# Patient Record
Sex: MALE | Race: WHITE | NOT HISPANIC OR LATINO | URBAN - METROPOLITAN AREA
[De-identification: names, ages, dates, MRNs, and addresses within clinical notes are randomized per-mention and may not be internally consistent; named-entity substitution may affect disease eponyms.]

---

## 2017-02-20 ENCOUNTER — APPOINTMENT (RX ONLY)
Dept: URBAN - METROPOLITAN AREA CLINIC 51 | Facility: CLINIC | Age: 62
Setting detail: DERMATOLOGY
End: 2017-02-20

## 2017-02-20 DIAGNOSIS — L21.8 OTHER SEBORRHEIC DERMATITIS: ICD-10-CM

## 2017-02-20 DIAGNOSIS — L738 OTHER SPECIFIED DISEASES OF HAIR AND HAIR FOLLICLES: ICD-10-CM

## 2017-02-20 DIAGNOSIS — L73.9 FOLLICULAR DISORDER, UNSPECIFIED: ICD-10-CM

## 2017-02-20 DIAGNOSIS — L57.0 ACTINIC KERATOSIS: ICD-10-CM

## 2017-02-20 DIAGNOSIS — L663 OTHER SPECIFIED DISEASES OF HAIR AND HAIR FOLLICLES: ICD-10-CM

## 2017-02-20 PROBLEM — L02.222 FURUNCLE OF BACK [ANY PART, EXCEPT BUTTOCK]: Status: ACTIVE | Noted: 2017-02-20

## 2017-02-20 PROBLEM — L85.3 XEROSIS CUTIS: Status: ACTIVE | Noted: 2017-02-20

## 2017-02-20 PROBLEM — J30.1 ALLERGIC RHINITIS DUE TO POLLEN: Status: ACTIVE | Noted: 2017-02-20

## 2017-02-20 PROCEDURE — 99213 OFFICE O/P EST LOW 20 MIN: CPT | Mod: 25

## 2017-02-20 PROCEDURE — ? COUNSELING

## 2017-02-20 PROCEDURE — ? LIQUID NITROGEN

## 2017-02-20 PROCEDURE — 17000 DESTRUCT PREMALG LESION: CPT

## 2017-02-20 ASSESSMENT — LOCATION ZONE DERM
LOCATION ZONE: FACE
LOCATION ZONE: NOSE
LOCATION ZONE: TRUNK

## 2017-02-20 ASSESSMENT — LOCATION SIMPLE DESCRIPTION DERM
LOCATION SIMPLE: UPPER BACK
LOCATION SIMPLE: RIGHT CHEEK
LOCATION SIMPLE: NOSE
LOCATION SIMPLE: LEFT CHEEK

## 2017-02-20 ASSESSMENT — LOCATION DETAILED DESCRIPTION DERM
LOCATION DETAILED: LEFT INFERIOR CENTRAL MALAR CHEEK
LOCATION DETAILED: RIGHT CENTRAL MALAR CHEEK
LOCATION DETAILED: NASAL TIP
LOCATION DETAILED: SUPERIOR THORACIC SPINE

## 2017-02-20 NOTE — PROCEDURE: LIQUID NITROGEN
Render Post-Care Instructions In Note?: yes
Duration Of Freeze Thaw-Cycle (Seconds): 5
Post-Care Instructions: I reviewed with the patient in detail post-care instructions. Patient is to wear sunprotection, and avoid picking at any of the treated lesions. Pt may apply Vaseline to crusted or scabbing areas.
Detail Level: Zone
Number Of Freeze-Thaw Cycles: 2 freeze-thaw cycles
Consent: The patient's verbal consent was obtained including but not limited to risks of crusting, scabbing, blistering, scarring, darker or lighter pigmentary change, recurrence, incomplete removal and infection.

## 2017-02-20 NOTE — HPI: RASH
How Severe Is Your Rash?: mild
Is This A New Presentation, Or A Follow-Up?: Follow Up Rash
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
Additional History: Pt states the rash comes and goes

## 2017-03-11 ENCOUNTER — HOSPITAL ENCOUNTER (EMERGENCY)
Dept: HOSPITAL 75 - ER | Age: 62
Discharge: HOME | End: 2017-03-11
Payer: SELF-PAY

## 2017-03-11 VITALS — WEIGHT: 156 LBS | HEIGHT: 69 IN | BODY MASS INDEX: 23.11 KG/M2

## 2017-03-11 VITALS — SYSTOLIC BLOOD PRESSURE: 147 MMHG | DIASTOLIC BLOOD PRESSURE: 88 MMHG
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
## 2017-03-11 DIAGNOSIS — S39.012A: Primary | ICD-10-CM
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
## 2017-03-11 DIAGNOSIS — S29.011A: ICD-10-CM

## 2017-03-11 LAB
ALBUMIN SERPL-MCNC: 4 G/DL (ref 3.2–4.5)
ALT SERPL-CCNC: 31 U/L (ref 0–55)
ANION GAP SERPL CALC-SCNC: 10 MMOL/L (ref 5–14)
AST SERPL-CCNC: 30 U/L (ref 5–34)
BASOPHILS # BLD AUTO: 0 10^3/UL (ref 0–0.1)
BASOPHILS NFR BLD AUTO: 0 % (ref 0–10)
BILIRUB SERPL-MCNC: 0.8 MG/DL (ref 0.1–1)
BILIRUB UR QL STRIP: NEGATIVE
BUN SERPL-MCNC: 17 MG/DL (ref 7–18)
BUN/CREAT SERPL: 17
CALCIUM SERPL-MCNC: 8.9 MG/DL (ref 8.5–10.1)
CHLORIDE SERPL-SCNC: 104 MMOL/L (ref 98–107)
CO2 SERPL-SCNC: 24 MMOL/L (ref 21–32)
CREAT SERPL-MCNC: 1.02 MG/DL (ref 0.6–1.3)
EOSINOPHIL # BLD AUTO: 0.1 10^3/UL (ref 0–0.3)
EOSINOPHIL NFR BLD AUTO: 1 % (ref 0–10)
ERYTHROCYTE [DISTWIDTH] IN BLOOD BY AUTOMATED COUNT: 12.2 % (ref 10–14.5)
GFR SERPLBLD BASED ON 1.73 SQ M-ARVRAT: > 60 ML/MIN
GLUCOSE SERPL-MCNC: 95 MG/DL (ref 70–105)
KETONES UR QL STRIP: NEGATIVE
LEUKOCYTE ESTERASE UR QL STRIP: NEGATIVE
LIPASE SERPL-CCNC: 35 U/L (ref 8–78)
LYMPHOCYTES # BLD AUTO: 1.3 X 10^3 (ref 1–4)
LYMPHOCYTES NFR BLD AUTO: 14 % (ref 12–44)
MCH RBC QN AUTO: 33 PG (ref 25–34)
MCHC RBC AUTO-ENTMCNC: 36 G/DL (ref 32–36)
MCV RBC AUTO: 90 FL (ref 80–99)
MONOCYTES # BLD AUTO: 1.4 X 10^3 (ref 0–1)
MONOCYTES NFR BLD AUTO: 14 % (ref 0–12)
NEUTROPHILS # BLD AUTO: 6.8 X 10^3 (ref 1.8–7.8)
NEUTROPHILS NFR BLD AUTO: 71 % (ref 42–75)
NITRITE UR QL STRIP: NEGATIVE
PH UR STRIP: 5 [PH] (ref 5–9)
PLATELET # BLD: 311 10^3/UL (ref 130–400)
PMV BLD AUTO: 10.3 FL (ref 7.4–10.4)
POTASSIUM SERPL-SCNC: 3.9 MMOL/L (ref 3.6–5)
PROT SERPL-MCNC: 7.2 G/DL (ref 6.4–8.2)
PROT UR QL STRIP: (no result)
RBC # BLD AUTO: 4.82 10^6/UL (ref 4.35–5.85)
SODIUM SERPL-SCNC: 138 MMOL/L (ref 135–145)
SP GR UR STRIP: 1.02 (ref 1.02–1.02)
SQUAMOUS #/AREA URNS HPF: (no result) /HPF
TROPONIN I SERPL-MCNC: < 0.3 NG/ML (ref ?–0.3)
UROBILINOGEN UR-MCNC: NORMAL MG/DL
WBC # BLD AUTO: 9.7 10^3/UL (ref 4.3–11)
WBC #/AREA URNS HPF: (no result) /HPF

## 2017-03-11 PROCEDURE — 93005 ELECTROCARDIOGRAM TRACING: CPT

## 2017-03-11 PROCEDURE — 85025 COMPLETE CBC W/AUTO DIFF WBC: CPT

## 2017-03-11 PROCEDURE — 71275 CT ANGIOGRAPHY CHEST: CPT

## 2017-03-11 PROCEDURE — 80053 COMPREHEN METABOLIC PANEL: CPT

## 2017-03-11 PROCEDURE — 71020: CPT

## 2017-03-11 PROCEDURE — 36415 COLL VENOUS BLD VENIPUNCTURE: CPT

## 2017-03-11 PROCEDURE — 74175 CTA ABDOMEN W/CONTRAST: CPT

## 2017-03-11 PROCEDURE — 85379 FIBRIN DEGRADATION QUANT: CPT

## 2017-03-11 PROCEDURE — 96360 HYDRATION IV INFUSION INIT: CPT

## 2017-03-11 PROCEDURE — 84484 ASSAY OF TROPONIN QUANT: CPT

## 2017-03-11 PROCEDURE — 81000 URINALYSIS NONAUTO W/SCOPE: CPT

## 2017-03-11 PROCEDURE — 83690 ASSAY OF LIPASE: CPT

## 2017-03-11 NOTE — DIAGNOSTIC IMAGING REPORT
PROCEDURE: CT angiography of the abdomen and chest with and

without contrast.



TECHNIQUE: After intravenous administration of contrast, thin

section axial CT angiography of the abdomen and chest were

obtained. Multiple MIP reformats were provided.



INDICATION:  Chest pain, elevated d-dimer.



FINDINGS: There are no discrete pulmonary nodules, masses or

infiltrates. There is no pleural or pericardial fluid. There is

no pneumothorax. There is no pathologically enlarged adenopathy

in the chest. The thoracic aorta is normal in caliber without

evidence of dissection. There are no filling defects seen within

the pulmonary arteries to suggest pulmonary embolism. There are

mild degenerative changes in the spine.



The liver is normal in size without focal lesions. Gallbladder

is unremarkable. There is no biliary ductal dilatation. Spleen

is unremarkable. The pancreas and adrenal glands are

unremarkable. Kidneys are normal. The bowel gas pattern is

nonspecific. There is no free air. There is no ascites. There

are no focal inflammatory changes.



IMPRESSION:

1. No acute abnormality of the chest. Specifically, there is no

evidence of pulmonary embolism or aortic dissection.



2. No acute abnormality in the abdomen.



Dictated by:



Dictated on workstation # [id number]

## 2017-03-11 NOTE — DIAGNOSTIC IMAGING REPORT
INDICATION:  Left-sided rib pain..



TECHNIQUE:  Two view chest   8:13 AM



CORRELATION STUDY:   None



FINDINGS:

The heart size, mediastinal configuration and pulmonary

vasculature are within normal limits.  Suggestion of minimal

atelectasis at the right perihilar region. Remaining lung fields

clear and unremarkable.  Visualized osseous structures are

unremarkable.



IMPRESSION:

1. No radiographic evidence for acute abnormality of the chest.



Dictated by:



Dictated on workstation # [id number]

## 2017-03-11 NOTE — ED ABDOMINAL PAIN
General


Chief Complaint:  Abdominal/GI Problems


Stated Complaint:  L SIDE PAIN


Source of Information:  Patient


Exam Limitations:  No Limitations





History of Present Illness


Time Seen By Provider:  07:20


Initial Comments


Here with report of left side low chest wall, upper abdominal pain that is 

worse with deep breathing and better at rest.  States it is 5 out of 10 and 

resting an 8 out of 10 with a deep breath.  Denies shortness of breath.  Was 

not sure if it is his back or ribs.  Does not remember any specific injury.  

Denies nausea or vomiting.  Denies fever or chills.


Timing/Duration:  1-2 Days


Severity/Quality:  Moderate, Aching


Location:  LUQ


Radiation:  Chest, Flank


Modifying Factors:  Worsens With Breathing, Worsens With Movement, Improves 

With Resting


Associated Symptoms:  No Diaphoresis, No Fever/Chills, No Nausea/Vomiting, No 

Shortness of Air, No Weakness





Allergies and Home Medications


Allergies


Coded Allergies:  


     No Known Drug Allergies (Unverified , 3/11/17)





Home Medications


No Active Prescriptions or Reported Meds





Review of Systems


Constitutional:   see HPINo chills, No fever


EENTM:  No Throat Pain,  Other (runny nose with allergies but this is chronic 

and unchanged)


Respiratory:  Denies Cough, Denies Shortness of Air


Cardiovascular:   See HPIDenies Lightheadedness, Denies Palpitations


Gastrointestinal:   Abdominal PainDenies Diarrhea, Denies Nausea, Denies Rectal 

Bleeding, Denies Vomiting


Genitourinary:   See HPI Flank PainDenies Hematuria, Denies Pain


Musculoskeletal:   back painNo muscle weakness


Skin:   no symptoms reported


Psychiatric/Neurological:   No Symptoms Reported





All Other Systems Reviewed


Negative Unless Noted:  Yes





Past Medical-Social-Family Hx


Patient Social History


Alcohol Use:  Denies Use


Recreational Drug Use:  No


Smoking Status:  Never a Smoker


Recent Foreign Travel:  No


Contact w/Someone Who Travel:  No


Recent Hopitalizations:  No





Surgeries


HX Surgeries:  No





Respiratory


Hx Respiratory Disorders:  No





Cardiovascular


Hx Cardiac Disorders:  No





Neurological


Hx Neurological Disorders:  No





Genitourinary


Hx Genitourinary Disorders:  No





Gastrointestinal


Hx Gastrointestinal Disorders:  No





Musculoskeletal


Hx Musculoskeletal Disorders:  No





Endocrine


Hx Endocrine Disorders:  No





HEENT


HX ENT Disorders:  No





Cancer


Hx Cancer:  No





Psychosocial


Hx Psychiatric Problems:  No





Reviewed Nursing Assessment


Reviewed/Agree w Nursing PMH:  Yes





Family Medical History


Significant Family History:  No Pertinent Family Hx





Physical Exam


Vital Signs





 VS - Last 72 Hours, by Label








 3/11/17





 07:13


 


Temp 98.7


 


Pulse 93


 


Resp 18


 


B/P 152/94


 


Pulse Ox 96


 


O2 Delivery Room Air





Capillary Refill :


General Appearance:   WD/WN no apparent distress


HEENT:   PERRL/EOMI pharynx normal


Neck:   full range of motion supple


Respiratory:   lungs clear normal breath sounds


Cardiovascular:   regular rate, rhythm no murmur tachycardia


Peripheral Pulses:  2+ Dorsalis Pedis (R), 2+ Left Dors-Pedis (L), 2+ Radial 

Pulses (R), 2+ Radial Pulses (L)


Gastrointestinal:   non tender soft


Extremities:   non-tender normal inspection


Back:   normal inspection no CVA tenderness no vertebral tenderness


Neurologic/Psychiatric:   alert oriented x 3


Skin:   normal color warm/dry





Progress/Results/Core Measures


Results/Orders


Lab Results





 Laboratory Tests








Test


  3/11/17


07:32 3/11/17


07:35 Range/Units


 


 


Alanine Aminotransferase


(ALT/SGPT) 31 


  


  0-55  U/L


 


 


Albumin 4.0   3.2-4.5  G/DL


 


Alkaline Phosphatase 80     U/L


 


Anion Gap 10   5-14  MMOL/L


 


Aspartate Amino Transf


(AST/SGOT) 30 


  


  5-34  U/L


 


 


BUN/Creatinine Ratio 17    


 


Basophils # (Auto)


  0.0 


  


  0.0-0.1


10^3/uL


 


Basophils (%) (Auto) 0   0-10  %


 


Blood Urea Nitrogen 17   7-18  MG/DL


 


Calcium Level 8.9   8.5-10.1  MG/DL


 


Carbon Dioxide Level 24   21-32  MMOL/L


 


Chloride Level 104     MMOL/L


 


Creatinine


  1.02 


  


  0.60-1.30


MG/DL


 


D-Dimer


  0.94 H


  


  0.00-0.49


UG/ML


 


Eosinophils # (Auto)


  0.1 


  


  0.0-0.3


10^3/uL


 


Eosinophils (%) (Auto) 1   0-10  %


 


Estimat Glomerular Filtration


Rate > 60 


  


   


 


 


Glucose Level 95     MG/DL


 


Hematocrit 43   40-54  %


 


Hemoglobin 15.8   13.3-17.7  G/DL


 


Lipase 35   8-78  U/L


 


Lymphocytes # (Auto) 1.3   1.0-4.0  X 10^3


 


Lymphocytes (%) (Auto) 14   12-44  %


 


Mean Corpuscular Hemoglobin 33   25-34  PG


 


Mean Corpuscular Hemoglobin


Concent 36 


  


  32-36  G/DL


 


 


Mean Corpuscular Volume 90   80-99  FL


 


Mean Platelet Volume 10.3   7.4-10.4  FL


 


Monocytes # (Auto) 1.4 H  0.0-1.0  X 10^3


 


Monocytes (%) (Auto) 14 H  0-12  %


 


Neutrophils # (Auto) 6.8   1.8-7.8  X 10^3


 


Neutrophils (%) (Auto) 71   42-75  %


 


Platelet Count


  311 


  


  130-400


10^3/uL


 


Potassium Level 3.9   3.6-5.0  MMOL/L


 


Red Blood Count


  4.82 


  


  4.35-5.85


10^6/uL


 


Red Cell Distribution Width 12.2   10.0-14.5  %


 


Sodium Level 138   135-145  MMOL/L


 


Total Bilirubin 0.8   0.1-1.0  MG/DL


 


Total Protein 7.2   6.4-8.2  G/DL


 


Troponin I < 0.30   <0.30  NG/ML


 


White Blood Count


  9.7 


  


  4.3-11.0


10^3/uL


 


Urine Bacteria  NEGATIVE   /HPF


 


Urine Bilirubin  NEGATIVE  NEGATIVE  


 


Urine Casts  NONE   /LPF


 


Urine Clarity  CLEAR   


 


Urine Color  YELLOW   


 


Urine Crystals  NONE   /LPF


 


Urine Culture Indicated  NO   


 


Urine Glucose (UA)  NEGATIVE  NEGATIVE  


 


Urine Ketones  NEGATIVE  NEGATIVE  


 


Urine Leukocyte Esterase  NEGATIVE  NEGATIVE  


 


Urine Mucus  NEGATIVE   /LPF


 


Urine Nitrite  NEGATIVE  NEGATIVE  


 


Urine Protein  1+ H NEGATIVE  


 


Urine RBC  NONE   /HPF


 


Urine RBC (Auto)  NEGATIVE  NEGATIVE  


 


Urine Specific Gravity  1.025 H 1.016-1.022  


 


Urine Squamous Epithelial


Cells 


  NONE 


   /HPF


 


 


Urine Urobilinogen  NORMAL  NORMAL  MG/DL


 


Urine WBC  NONE   /HPF


 


Urine pH  5  5-9  








My Orders





 Orders-ZAHIRA CABRERA MD


Cbc With Automated Diff (3/11/17 07:25)


Comprehensive Metabolic Panel (3/11/17 07:25)


Fibrin Degradation Products (3/11/17 07:25)


Lipase (3/11/17 07:25)


Troponin I (3/11/17 07:25)


Ua Culture If Indicated (3/11/17 07:25)


Chest Pa/Lat (2 View) (3/11/17 07:25)


Saline Lock/Iv-Start (3/11/17 07:25)


Ns Iv 1000 Ml (Sodium Chloride 0.9%) (3/11/17 07:25)


Ekg Tracing (3/11/17 07:25)


Ct Angio Chest/Abd (3/11/17 08:39)


Iohexol Injection (Omnipaque 350 Mg/Ml 1 (3/11/17 08:45)


Sodium Chloride Flush (Catheter Flush Sy (3/11/17 08:45)


Ns (Ivpb) (Sodium Chloride 0.9% Ivpb Bag (3/11/17 08:45)





Medications Given in ED





 Current Medications








 Medications  Dose


 Ordered  Sig/Ngoc


 Route  Start Time


 Stop Time Status Last Admin


Dose Admin


 


 Iohexol  100 ml  ONCE  ONCE


 IV  3/11/17 08:45


 3/11/17 08:46 DC 3/11/17 08:58


100 ML


 


 Sodium Chloride  100 ml  ONCE  ONCE


 IV  3/11/17 08:45


 3/11/17 08:46 DC 3/11/17 08:59


80 ML


 


 Sodium Chloride  1,000 ml @ 


 0 mls/hr  Q0M ONCE


 IV  3/11/17 07:25


 3/11/17 07:30 DC 3/11/17 07:47


1,000 MLS/HR








Vital Signs/I&O





 Vital Sign - Last 12Hours








 3/11/17





 07:13


 


Temp 98.7


 


Pulse 93


 


Resp 18


 


B/P 152/94


 


Pulse Ox 96


 


O2 Delivery Room Air








Progress Note :  


Progress Note


Seen and evaluated.  IV, labs, EKG and chest x-ray ordered.  UA ordered.  

Normal saline 1 L bolus.  Patient declined pain medicine.  Monitor patient.  No 

acute findings except for d-dimer elevation.  CT angiogram chest and abdomen 

ordered for elevated d-dimer.  0955: CT angiogram was negative.  Results below.

  No other acute findings.  We will treat as possible muscle strain.  Patient 

admits that he had concerns about aneurysm because a friend of his  from 

aneurysm last year.  Symptoms that he has certainly mimic possible aneurysm but 

this is negative on the CT and patient was informed and greatly comforted.  He 

will follow-up with his doctor this week.  Discharged home with return 

precautions.  Patient verbalize understanding instructions and agreement with 

plan.





ECG


Initial ECG Impression Date:  Mar 11, 2017


Initial ECG Impression Time:  07:40


Initial ECG Rate:  81


Initial ECG Rhythm:  Normal Sinus


Initial ECG Impression:  Normal


Initial ECG Comparisson:  No Previous ECG Available


Comment


Sinus rhythm with normal axis.  No evidence of ST elevation MI.  No previous 

available for comparison.  Interpreted by me.





Diagnostic Imaging





   Diagonstic Imaging:  Xray


   Plain Films/CT/US/NM/MRI:  chest


Comments


 VIA [facility], [facility].


 [place], [place]





NAME:   RACHEL KING


[facility] REC#:   [id number]


ACCOUNT#:   [id number]


PT STATUS:   REG ER


:   1955


PHYSICIAN:   ZAHIRA CABRERA MD


ADMIT DATE:   17/ER


 ***Draft***


Date of Exam:17





CHEST PA/LAT (2 VIEW)














INDICATION:  Left-sided rib pain..





TECHNIQUE:  Two view chest   8:13 AM





CORRELATION STUDY:   None





FINDINGS:


The heart size, mediastinal configuration and pulmonary


vasculature are within normal limits.  Suggestion of minimal


atelectasis at the right perihilar region. Remaining lung fields


clear and unremarkable.  Visualized osseous structures are


unremarkable.





IMPRESSION:


1. No radiographic evidence for acute abnormality of the chest.





Dictated on workstation # [id number]








Dict:   [id number]


Trans:   17 0815


ELA [id number]





Interpreted by:     BRIGIDO GUERRERO DO


Electronically signed by:


   Reviewed:  Reviewed by Me








   Diagonstic Imaging:  CT


   Plain Films/CT/US/NM/MRI:  chest, abdomen


Comments


 [place], [place]





NAME:   RACHEL KING


[facility] REC#:   [id number]


ACCOUNT#:   [id number]


PT STATUS:   REG ER


:   1955


PHYSICIAN:   ZAHIRA CABRERA MD


ADMIT DATE:   17/ER


 ***Draft***


Date of Exam:17





CT ANGIO CHEST/ABD














PROCEDURE: CT angiography of the abdomen and chest with and


without contrast.





TECHNIQUE: After intravenous administration of contrast, thin


section axial CT angiography of the abdomen and chest were


obtained. Multiple MIP reformats were provided.





INDICATION:  Chest pain, elevated d-dimer.





FINDINGS: There are no discrete pulmonary nodules, masses or


infiltrates. There is no pleural or pericardial fluid. There is


no pneumothorax. There is no pathologically enlarged adenopathy


in the chest. The thoracic aorta is normal in caliber without


evidence of dissection. There are no filling defects seen within


the pulmonary arteries to suggest pulmonary embolism. There are


mild degenerative changes in the spine.





The liver is normal in size without focal lesions. Gallbladder


is unremarkable. There is no biliary ductal dilatation. Spleen


is unremarkable. The pancreas and adrenal glands are


unremarkable. Kidneys are normal. The bowel gas pattern is


nonspecific. There is no free air. There is no ascites. There


are no focal inflammatory changes.





IMPRESSION:


1. No acute abnormality of the chest. Specifically, there is no


evidence of pulmonary embolism or aortic dissection.





2. No acute abnormality in the abdomen.





Dictated on workstation # [id number]








Dict:   [id number]


Trans:   17 0944


 [id number]





Interpreted by:     BERENICE FENTON


Electronically signed by:


   Reviewed:  Reviewed by Me





Departure


Impression


Impression:  


 Primary Impression:  


 Back strain


 Qualified Code:  S39.012A - Strain of muscle, fascia and tendon of lower back, 

initial encounter


 Additional Impression:  


 Chest wall muscle strain


 Qualified Code:  S29.011A - Strain of muscle and tendon of front wall of thorax

, initial encounter


Disposition:  01 HOME, SELF-CARE


Condition:  Improved





Departure-Patient Inst.


Decision time for Depature:  10:02


Referrals:  


CHRISTINA MEIER MD (PCP/Family)


Primary Care Physician


Patient Instructions:  Muscle Strain (DC)





Add. Discharge Instructions:


All discharge instructions reviewed with patient and/or family. Voiced 

understanding.





Follow-up with Dr. Meier this week for recheck and further evaluation.  Take 

medication as directed.  You may take over-the-counter Aleve 1 or 2 tablets 

twice daily for the next several days as needed.  Drink plenty of fluids as 

this medicine can be hard on the kidneys if you're dehydrated.  Return for 

worse pain, fever, vomiting, weakness, breathing problems or other concerns as 

needed.


Scripts


Cyclobenzaprine HCl 10 Mg Ovjoou17 Mg PO Q8H PRN SPASMS #15 TAB


   Prov:ZAHIRA CABRERA MD         3/11/17





Copy


Copies To 1:   CHRISTINA MEIER MD, TIMOTHY D MD Mar 11, 2017 07:37

## 2017-11-01 ENCOUNTER — APPOINTMENT (RX ONLY)
Dept: URBAN - METROPOLITAN AREA CLINIC 51 | Facility: CLINIC | Age: 62
Setting detail: DERMATOLOGY
End: 2017-11-01

## 2017-11-01 DIAGNOSIS — L738 OTHER SPECIFIED DISEASES OF HAIR AND HAIR FOLLICLES: ICD-10-CM

## 2017-11-01 DIAGNOSIS — L57.0 ACTINIC KERATOSIS: ICD-10-CM

## 2017-11-01 DIAGNOSIS — L663 OTHER SPECIFIED DISEASES OF HAIR AND HAIR FOLLICLES: ICD-10-CM

## 2017-11-01 DIAGNOSIS — L73.9 FOLLICULAR DISORDER, UNSPECIFIED: ICD-10-CM

## 2017-11-01 PROBLEM — L02.426 FURUNCLE OF LEFT LOWER LIMB: Status: ACTIVE | Noted: 2017-11-01

## 2017-11-01 PROCEDURE — ? TREATMENT REGIMEN

## 2017-11-01 PROCEDURE — ? COUNSELING

## 2017-11-01 PROCEDURE — 99213 OFFICE O/P EST LOW 20 MIN: CPT | Mod: 25

## 2017-11-01 PROCEDURE — ? LIQUID NITROGEN

## 2017-11-01 PROCEDURE — 17000 DESTRUCT PREMALG LESION: CPT

## 2017-11-01 ASSESSMENT — LOCATION SIMPLE DESCRIPTION DERM
LOCATION SIMPLE: LEFT CHEEK
LOCATION SIMPLE: RIGHT CHEEK
LOCATION SIMPLE: LEFT PRETIBIAL REGION
LOCATION SIMPLE: RIGHT EAR
LOCATION SIMPLE: LEFT HAND
LOCATION SIMPLE: RIGHT TEMPLE
LOCATION SIMPLE: RIGHT HAND

## 2017-11-01 ASSESSMENT — LOCATION DETAILED DESCRIPTION DERM
LOCATION DETAILED: RIGHT ULNAR DORSAL HAND
LOCATION DETAILED: RIGHT ANTIHELIX
LOCATION DETAILED: RIGHT ANTERIOR EARLOBE
LOCATION DETAILED: RIGHT CENTRAL MALAR CHEEK
LOCATION DETAILED: LEFT PROXIMAL PRETIBIAL REGION
LOCATION DETAILED: LEFT SUPERIOR MEDIAL MALAR CHEEK
LOCATION DETAILED: RIGHT SUPERIOR CENTRAL MALAR CHEEK
LOCATION DETAILED: RIGHT INFERIOR TEMPLE
LOCATION DETAILED: LEFT DORSAL MIDDLE METACARPOPHALANGEAL JOINT

## 2017-11-01 ASSESSMENT — LOCATION ZONE DERM
LOCATION ZONE: EAR
LOCATION ZONE: HAND
LOCATION ZONE: FACE
LOCATION ZONE: LEG

## 2017-11-01 NOTE — PROCEDURE: LIQUID NITROGEN
Number Of Freeze-Thaw Cycles: 2 freeze-thaw cycles
Consent: The patient's verbal consent was obtained including but not limited to risks of crusting, scabbing, blistering, scarring, darker or lighter pigmentary change, recurrence, incomplete removal and infection.
Detail Level: Detailed
Duration Of Freeze Thaw-Cycle (Seconds): 5
Render Post-Care Instructions In Note?: yes
Post-Care Instructions: I reviewed with the patient in detail post-care instructions. Patient is to wear sunprotection, and avoid picking at any of the treated lesions. Pt may apply Vaseline to crusted or scabbing areas.

## 2017-11-01 NOTE — HPI: SKIN LESIONS
Is This A New Presentation, Or A Follow-Up?: Skin Lesions
How Severe Is Your Skin Lesion?: mild
Have Your Skin Lesions Been Treated?: not been treated
Additional History: 6 month skin check.

## 2017-11-01 NOTE — PROCEDURE: TREATMENT REGIMEN
Plan: If areas persist, patient to call and we will treat with antibiotics
Samples Given: Vanicream HC
Detail Level: Detailed

## 2018-05-01 ENCOUNTER — APPOINTMENT (RX ONLY)
Dept: URBAN - METROPOLITAN AREA CLINIC 51 | Facility: CLINIC | Age: 63
Setting detail: DERMATOLOGY
End: 2018-05-01

## 2018-05-01 DIAGNOSIS — L57.0 ACTINIC KERATOSIS: ICD-10-CM

## 2018-05-01 DIAGNOSIS — D22 MELANOCYTIC NEVI: ICD-10-CM

## 2018-05-01 PROBLEM — D22.5 MELANOCYTIC NEVI OF TRUNK: Status: ACTIVE | Noted: 2018-05-01

## 2018-05-01 PROCEDURE — 17003 DESTRUCT PREMALG LES 2-14: CPT

## 2018-05-01 PROCEDURE — ? COUNSELING

## 2018-05-01 PROCEDURE — ? LIQUID NITROGEN

## 2018-05-01 PROCEDURE — 17000 DESTRUCT PREMALG LESION: CPT

## 2018-05-01 ASSESSMENT — LOCATION ZONE DERM
LOCATION ZONE: TRUNK
LOCATION ZONE: FACE
LOCATION ZONE: EAR
LOCATION ZONE: HAND

## 2018-05-01 ASSESSMENT — LOCATION DETAILED DESCRIPTION DERM
LOCATION DETAILED: RIGHT SUPERIOR HELIX
LOCATION DETAILED: LEFT MEDIAL ZYGOMA
LOCATION DETAILED: RIGHT CENTRAL MALAR CHEEK
LOCATION DETAILED: LEFT RADIAL DORSAL HAND
LOCATION DETAILED: LEFT SUPERIOR LATERAL MIDBACK
LOCATION DETAILED: RIGHT CENTRAL TEMPLE

## 2018-05-01 ASSESSMENT — LOCATION SIMPLE DESCRIPTION DERM
LOCATION SIMPLE: RIGHT TEMPLE
LOCATION SIMPLE: LEFT LOWER BACK
LOCATION SIMPLE: RIGHT CHEEK
LOCATION SIMPLE: LEFT ZYGOMA
LOCATION SIMPLE: LEFT HAND
LOCATION SIMPLE: RIGHT EAR

## 2018-05-01 ASSESSMENT — PAIN INTENSITY VAS: HOW INTENSE IS YOUR PAIN 0 BEING NO PAIN, 10 BEING THE MOST SEVERE PAIN POSSIBLE?: NO PAIN

## 2018-05-01 NOTE — PROCEDURE: LIQUID NITROGEN
Consent: The patient's verbal consent was obtained including but not limited to risks of crusting, scabbing, blistering, scarring, darker or lighter pigmentary change, recurrence, incomplete removal and infection.
Detail Level: Zone
Duration Of Freeze Thaw-Cycle (Seconds): 5
Number Of Freeze-Thaw Cycles: 2 freeze-thaw cycles
Post-Care Instructions: I reviewed with the patient in detail post-care instructions. Patient is to wear sunprotection, and avoid picking at any of the treated lesions. Pt may apply Vaseline to crusted or scabbing areas.
Render Post-Care Instructions In Note?: yes

## 2018-11-13 ENCOUNTER — APPOINTMENT (RX ONLY)
Dept: URBAN - METROPOLITAN AREA CLINIC 51 | Facility: CLINIC | Age: 63
Setting detail: DERMATOLOGY
End: 2018-11-13

## 2018-11-13 DIAGNOSIS — L57.0 ACTINIC KERATOSIS: ICD-10-CM

## 2018-11-13 PROCEDURE — ? PRESCRIPTION

## 2018-11-13 PROCEDURE — ? COUNSELING

## 2018-11-13 PROCEDURE — 17000 DESTRUCT PREMALG LESION: CPT

## 2018-11-13 PROCEDURE — ? LIQUID NITROGEN

## 2018-11-13 PROCEDURE — 17003 DESTRUCT PREMALG LES 2-14: CPT

## 2018-11-13 RX ORDER — IMIQUIMOD 50 MG/G
CREAM TOPICAL
Qty: 12 | Refills: 1 | Status: ERX | COMMUNITY
Start: 2018-11-13

## 2018-11-13 RX ADMIN — IMIQUIMOD: 50 CREAM TOPICAL at 13:28

## 2018-11-13 ASSESSMENT — LOCATION DETAILED DESCRIPTION DERM
LOCATION DETAILED: LEFT NASAL ALA
LOCATION DETAILED: LEFT NASAL DORSUM
LOCATION DETAILED: RIGHT FOREHEAD
LOCATION DETAILED: LEFT LATERAL ZYGOMA
LOCATION DETAILED: RIGHT INFERIOR MEDIAL FOREHEAD

## 2018-11-13 ASSESSMENT — LOCATION ZONE DERM
LOCATION ZONE: NOSE
LOCATION ZONE: FACE

## 2018-11-13 ASSESSMENT — LOCATION SIMPLE DESCRIPTION DERM
LOCATION SIMPLE: LEFT ZYGOMA
LOCATION SIMPLE: RIGHT FOREHEAD
LOCATION SIMPLE: NOSE
LOCATION SIMPLE: LEFT NOSE

## 2018-11-13 ASSESSMENT — PAIN INTENSITY VAS: HOW INTENSE IS YOUR PAIN 0 BEING NO PAIN, 10 BEING THE MOST SEVERE PAIN POSSIBLE?: NO PAIN

## 2018-11-13 NOTE — PROCEDURE: LIQUID NITROGEN
Render Post-Care Instructions In Note?: yes
Consent: The patient's verbal consent was obtained including but not limited to risks of crusting, scabbing, blistering, scarring, darker or lighter pigmentary change, recurrence, incomplete removal and infection.
Number Of Freeze-Thaw Cycles: 2 freeze-thaw cycles
Detail Level: Zone
Duration Of Freeze Thaw-Cycle (Seconds): 5
Post-Care Instructions: I reviewed with the patient in detail post-care instructions. Patient is to wear sunprotection, and avoid picking at any of the treated lesions. Pt may apply Vaseline to crusted or scabbing areas.

## 2019-07-13 ENCOUNTER — HOSPITAL ENCOUNTER (EMERGENCY)
Dept: HOSPITAL 75 - ER | Age: 64
Discharge: HOME | End: 2019-07-13
Payer: SELF-PAY

## 2019-07-13 VITALS — BODY MASS INDEX: 22.96 KG/M2 | WEIGHT: 155 LBS | HEIGHT: 69 IN

## 2019-07-13 VITALS — DIASTOLIC BLOOD PRESSURE: 100 MMHG | SYSTOLIC BLOOD PRESSURE: 137 MMHG

## 2019-07-13 DIAGNOSIS — K64.4: Primary | ICD-10-CM

## 2019-07-13 LAB
ALBUMIN SERPL-MCNC: 4.1 GM/DL (ref 3.2–4.5)
ALP SERPL-CCNC: 71 U/L (ref 40–136)
ALT SERPL-CCNC: 28 U/L (ref 0–55)
BASOPHILS # BLD AUTO: 0 10^3/UL (ref 0–0.1)
BASOPHILS NFR BLD AUTO: 1 % (ref 0–10)
BILIRUB SERPL-MCNC: 0.4 MG/DL (ref 0.1–1)
BUN/CREAT SERPL: 16
CALCIUM SERPL-MCNC: 8.6 MG/DL (ref 8.5–10.1)
CHLORIDE SERPL-SCNC: 107 MMOL/L (ref 98–107)
CO2 SERPL-SCNC: 24 MMOL/L (ref 21–32)
CREAT SERPL-MCNC: 1.1 MG/DL (ref 0.6–1.3)
EOSINOPHIL # BLD AUTO: 0.3 10^3/UL (ref 0–0.3)
EOSINOPHIL NFR BLD AUTO: 5 % (ref 0–10)
ERYTHROCYTE [DISTWIDTH] IN BLOOD BY AUTOMATED COUNT: 12.3 % (ref 10–14.5)
GFR SERPLBLD BASED ON 1.73 SQ M-ARVRAT: > 60 ML/MIN
GLUCOSE SERPL-MCNC: 93 MG/DL (ref 70–105)
HCT VFR BLD CALC: 43 % (ref 40–54)
HGB BLD-MCNC: 15.4 G/DL (ref 13.3–17.7)
LYMPHOCYTES # BLD AUTO: 1.6 X 10^3 (ref 1–4)
LYMPHOCYTES NFR BLD AUTO: 26 % (ref 12–44)
MANUAL DIFFERENTIAL PERFORMED BLD QL: NO
MCH RBC QN AUTO: 33 PG (ref 25–34)
MCHC RBC AUTO-ENTMCNC: 36 G/DL (ref 32–36)
MCV RBC AUTO: 92 FL (ref 80–99)
MONOCYTES # BLD AUTO: 0.6 X 10^3 (ref 0–1)
MONOCYTES NFR BLD AUTO: 9 % (ref 0–12)
NEUTROPHILS # BLD AUTO: 3.7 X 10^3 (ref 1.8–7.8)
NEUTROPHILS NFR BLD AUTO: 59 % (ref 42–75)
PLATELET # BLD: 297 10^3/UL (ref 130–400)
PMV BLD AUTO: 9.9 FL (ref 7.4–10.4)
POTASSIUM SERPL-SCNC: 4.1 MMOL/L (ref 3.6–5)
PROT SERPL-MCNC: 7.4 GM/DL (ref 6.4–8.2)
SODIUM SERPL-SCNC: 139 MMOL/L (ref 135–145)
WBC # BLD AUTO: 6.3 10^3/UL (ref 4.3–11)

## 2019-07-13 PROCEDURE — 85025 COMPLETE CBC W/AUTO DIFF WBC: CPT

## 2019-07-13 PROCEDURE — 80053 COMPREHEN METABOLIC PANEL: CPT

## 2019-07-13 PROCEDURE — 36415 COLL VENOUS BLD VENIPUNCTURE: CPT

## 2019-07-13 NOTE — ED GI
General


Chief Complaint:  Rect Problems


Stated Complaint:  BLOOD IN STOOL


Nursing Triage Note:  


AMB TO ROOM REPORTS LAST NIGHT AND TODAY NOTICED BRIGHT RED BLOOD IN STOOL. DOES




REPORT HAVING HEMORRHOIDS.


Sepsis Screen:  No Definite Risk


Source of Information:  Patient


Exam Limitations:  No Limitations





History of Present Illness


Date Seen by Provider:  Jul 13, 2019


Time Seen by Provider:  09:54


Initial Comments


The patient is a 63-year-old white male who presents with complaints of rectal 

bleeding.  He relates that he has had hemorrhoids for 25 years or more.  These 

of blood on occasion.  He has never had a colonoscopy.  He states that the blood

is bright red and both in the water of the toilet and on the tissue.  He has 

[place] over the past week or more.


Timing/Duration:  2-3 Days





Allergies and Home Medications


Allergies


Coded Allergies:  


     No Known Drug Allergies (Unverified , 3/11/17)





Patient Home Medication List


Home Medication List Reviewed:  Yes





Review of Systems


Review of Systems


Constitutional:  see HPI


EENTM:  No Symptoms Reported


Respiratory:  No Symptoms Reported


Cardiovascular:  No Symptoms Reported


Gastrointestinal:  Rectal Bleeding


Genitourinary:  No Symptoms Reported


Musculoskeletal:  no symptoms reported


Skin:  no symptoms reported


Psychiatric/Neurological:  No Symptoms Reported


Endocrine:  No Symptoms Reported


Hematologic/Lymphatic:  No Symptoms Reported





Past Medical-Social-Family Hx


Patient Social History


Alcohol Use:  Denies Use


Recreational Drug Use:  No


Smoking Status:  Never a Smoker


Recent Foreign Travel:  No


Contact w/Someone Who Travel:  No


Recent Infectious Disease Expo:  No


Recent Hopitalizations:  No





Past Medical History


Surgeries:  No


Respiratory:  No


Cardiac:  No


Neurological:  No


Gastrointestinal:  No


Musculoskeletal:  No


Endocrine:  No


Cancer:  No


Psychosocial:  No





Family Medical History


No Pertinent Family Hx





Physical Exam


Vital Signs





Vital Signs - First Documented








 7/13/19





 08:57


 


Temp 98.2


 


Pulse 83


 


Resp 18


 


B/P (MAP) 157/87 (110)





Capillary Refill : Less Than 3 Seconds


Height/Weight/BMI


Height: 5'9.00"


Weight: 155lbs. oz. 70.375773lf;  BMI


Method:Stated


General Appearance:  WD/WN, no apparent distress


HEENT:  normal ENT inspection


Neck:  non-tender, full range of motion, supple, normal inspection


Respiratory:  chest non-tender, lungs clear, normal breath sounds, no 

respiratory distress


Cardiovascular:  regular rate, rhythm, no edema, no gallop, no JVD


Exam Comments


Visual exam of the anus shows a 1 cm evacuated external hemorrhoid at 12 

o'clock.  Digital exam shows some grittiness in the canal suggesting internal 

hemorrhoids.





Progress/Results/Core Measures


Results/Orders


Lab Results





Laboratory Tests








Test


 7/13/19


09:57 Range/Units


 


 


White Blood Count


 6.3 


 4.3-11.0


10^3/uL


 


Red Blood Count


 4.72 


 4.35-5.85


10^6/uL


 


Hemoglobin 15.4  13.3-17.7  G/DL


 


Hematocrit 43  40-54  %


 


Mean Corpuscular Volume 92  80-99  FL


 


Mean Corpuscular Hemoglobin 33  25-34  PG


 


Mean Corpuscular Hemoglobin


Concent 36 


 32-36  G/DL





 


Red Cell Distribution Width 12.3  10.0-14.5  %


 


Platelet Count


 297 


 130-400


10^3/uL


 


Mean Platelet Volume 9.9  7.4-10.4  FL


 


Neutrophils (%) (Auto) 59  42-75  %


 


Lymphocytes (%) (Auto) 26  12-44  %


 


Monocytes (%) (Auto) 9  0-12  %


 


Eosinophils (%) (Auto) 5  0-10  %


 


Basophils (%) (Auto) 1  0-10  %


 


Neutrophils # (Auto) 3.7  1.8-7.8  X 10^3


 


Lymphocytes # (Auto) 1.6  1.0-4.0  X 10^3


 


Monocytes # (Auto) 0.6  0.0-1.0  X 10^3


 


Eosinophils # (Auto)


 0.3 


 0.0-0.3


10^3/uL


 


Basophils # (Auto)


 0.0 


 0.0-0.1


10^3/uL


 


Sodium Level 139  135-145  MMOL/L


 


Potassium Level 4.1  3.6-5.0  MMOL/L


 


Chloride Level 107    MMOL/L


 


Carbon Dioxide Level 24  21-32  MMOL/L


 


Anion Gap 8  5-14  MMOL/L


 


Blood Urea Nitrogen 18  7-18  MG/DL


 


Creatinine


 1.10 


 0.60-1.30


MG/DL


 


Estimat Glomerular Filtration


Rate > 60 


  





 


BUN/Creatinine Ratio 16   


 


Glucose Level 93    MG/DL


 


Calcium Level 8.6  8.5-10.1  MG/DL


 


Corrected Calcium 8.5  8.5-10.1  MG/DL


 


Total Bilirubin 0.4  0.1-1.0  MG/DL


 


Aspartate Amino Transf


(AST/SGOT) 27 


 5-34  U/L





 


Alanine Aminotransferase


(ALT/SGPT) 28 


 0-55  U/L





 


Alkaline Phosphatase 71    U/L


 


Total Protein 7.4  6.4-8.2  GM/DL


 


Albumin 4.1  3.2-4.5  GM/DL








My Orders





Orders - MISTY GOMEZ MD


Cbc With Automated Diff (7/13/19 09:33)


Comprehensive Metabolic Panel (7/13/19 09:33)





Vital Signs/I&O











 7/13/19





 08:57


 


Temp 98.2


 


Pulse 83


 


Resp 18


 


B/P (MAP) 157/87 (110)














Blood Pressure Mean:                    110











Departure


Impression





   Primary Impression:  


   external hemorrhoid


Disposition:  01 HOME, SELF-CARE


Condition:  Stable/Unchanged





Departure-Patient Inst.


Decision time for Depature:  10:51


Referrals:  


CHRISTINA MEIER MD (PCP/Family)


Primary Care Physician





Add. Discharge Instructions:  


All discharge instructions reviewed with patient and/or family. Voiced 

understanding.











MISTY GOMEZ MD             Jul 13, 2019 09:56

## 2021-04-21 ENCOUNTER — HOSPITAL ENCOUNTER (EMERGENCY)
Dept: HOSPITAL 75 - ER | Age: 66
Discharge: HOME | End: 2021-04-21
Payer: SELF-PAY

## 2021-04-21 VITALS — SYSTOLIC BLOOD PRESSURE: 142 MMHG | DIASTOLIC BLOOD PRESSURE: 81 MMHG

## 2021-04-21 VITALS — WEIGHT: 156.97 LBS | BODY MASS INDEX: 23.25 KG/M2 | HEIGHT: 69.02 IN

## 2021-04-21 DIAGNOSIS — Z20.822: ICD-10-CM

## 2021-04-21 DIAGNOSIS — Z77.22: ICD-10-CM

## 2021-04-21 DIAGNOSIS — I10: ICD-10-CM

## 2021-04-21 DIAGNOSIS — R07.89: Primary | ICD-10-CM

## 2021-04-21 LAB
ALBUMIN SERPL-MCNC: 4.6 GM/DL (ref 3.2–4.5)
ALP SERPL-CCNC: 75 U/L (ref 40–136)
ALT SERPL-CCNC: 34 U/L (ref 0–55)
APTT BLD: 29 SEC (ref 24–35)
BASOPHILS # BLD AUTO: 0 10^3/UL (ref 0–0.1)
BASOPHILS NFR BLD AUTO: 1 % (ref 0–10)
BILIRUB SERPL-MCNC: 0.8 MG/DL (ref 0.1–1)
BUN/CREAT SERPL: 14
CALCIUM SERPL-MCNC: 9.3 MG/DL (ref 8.5–10.1)
CHLORIDE SERPL-SCNC: 102 MMOL/L (ref 98–107)
CK SERPL-CCNC: 118 U/L (ref 30–200)
CO2 SERPL-SCNC: 23 MMOL/L (ref 21–32)
CREAT SERPL-MCNC: 1.31 MG/DL (ref 0.6–1.3)
EOSINOPHIL # BLD AUTO: 0.1 10^3/UL (ref 0–0.3)
EOSINOPHIL NFR BLD AUTO: 1 % (ref 0–10)
GFR SERPLBLD BASED ON 1.73 SQ M-ARVRAT: 55 ML/MIN
GLUCOSE SERPL-MCNC: 116 MG/DL (ref 70–105)
HCT VFR BLD CALC: 48 % (ref 40–54)
HGB BLD-MCNC: 16.6 G/DL (ref 13.3–17.7)
INR PPP: 1 (ref 0.8–1.4)
LYMPHOCYTES # BLD AUTO: 2.2 10^3/UL (ref 1–4)
LYMPHOCYTES NFR BLD AUTO: 30 % (ref 12–44)
MAGNESIUM SERPL-MCNC: 2.1 MG/DL (ref 1.6–2.4)
MANUAL DIFFERENTIAL PERFORMED BLD QL: NO
MCH RBC QN AUTO: 33 PG (ref 25–34)
MCHC RBC AUTO-ENTMCNC: 35 G/DL (ref 32–36)
MCV RBC AUTO: 95 FL (ref 80–99)
MONOCYTES # BLD AUTO: 1 10^3/UL (ref 0–1)
MONOCYTES NFR BLD AUTO: 14 % (ref 0–12)
NEUTROPHILS # BLD AUTO: 3.9 10^3/UL (ref 1.8–7.8)
NEUTROPHILS NFR BLD AUTO: 55 % (ref 42–75)
PLATELET # BLD: 348 10^3/UL (ref 130–400)
PMV BLD AUTO: 9.8 FL (ref 9–12.2)
POTASSIUM SERPL-SCNC: 3.8 MMOL/L (ref 3.6–5)
PROT SERPL-MCNC: 8.3 GM/DL (ref 6.4–8.2)
PROTHROMBIN TIME: 13.4 SEC (ref 12.2–14.7)
SODIUM SERPL-SCNC: 136 MMOL/L (ref 135–145)
WBC # BLD AUTO: 7.2 10^3/UL (ref 4.3–11)

## 2021-04-21 PROCEDURE — 84484 ASSAY OF TROPONIN QUANT: CPT

## 2021-04-21 PROCEDURE — 83874 ASSAY OF MYOGLOBIN: CPT

## 2021-04-21 PROCEDURE — 85730 THROMBOPLASTIN TIME PARTIAL: CPT

## 2021-04-21 PROCEDURE — 36415 COLL VENOUS BLD VENIPUNCTURE: CPT

## 2021-04-21 PROCEDURE — 80053 COMPREHEN METABOLIC PANEL: CPT

## 2021-04-21 PROCEDURE — 99284 EMERGENCY DEPT VISIT MOD MDM: CPT

## 2021-04-21 PROCEDURE — 87804 INFLUENZA ASSAY W/OPTIC: CPT

## 2021-04-21 PROCEDURE — 85025 COMPLETE CBC W/AUTO DIFF WBC: CPT

## 2021-04-21 PROCEDURE — 85379 FIBRIN DEGRADATION QUANT: CPT

## 2021-04-21 PROCEDURE — 86141 C-REACTIVE PROTEIN HS: CPT

## 2021-04-21 PROCEDURE — 93041 RHYTHM ECG TRACING: CPT

## 2021-04-21 PROCEDURE — 83735 ASSAY OF MAGNESIUM: CPT

## 2021-04-21 PROCEDURE — 85610 PROTHROMBIN TIME: CPT

## 2021-04-21 PROCEDURE — 82550 ASSAY OF CK (CPK): CPT

## 2021-04-21 PROCEDURE — 87635 SARS-COV-2 COVID-19 AMP PRB: CPT

## 2021-04-21 PROCEDURE — 71045 X-RAY EXAM CHEST 1 VIEW: CPT

## 2021-04-21 PROCEDURE — 93005 ELECTROCARDIOGRAM TRACING: CPT

## 2021-04-21 RX ADMIN — NITROGLYCERIN PRN MG: 0.4 TABLET SUBLINGUAL at 10:08

## 2021-04-21 RX ADMIN — NITROGLYCERIN PRN MG: 0.4 TABLET SUBLINGUAL at 09:58

## 2021-04-21 RX ADMIN — NITROGLYCERIN PRN MG: 0.4 TABLET SUBLINGUAL at 10:03

## 2021-04-21 NOTE — DIAGNOSTIC IMAGING REPORT
HISTORY: Chest pain.



COMPARISON: 03/11/2017.



TECHNIQUE: Frontal view of the chest.



FINDINGS:



Lung volumes are normal. No focal consolidation is seen. There is

no pleural effusion or pneumothorax. The cardiac silhouette is

normal in size.



IMPRESSION:

1. No acute pulmonary abnormality.



Dictated by: 



  Dictated on workstation # [id number]

## 2021-04-21 NOTE — ED CHEST PAIN
General


Chief Complaint:  Chest Pain


Stated Complaint:  CP,JAW PAIN,NECK PAIN


Nursing Triage Note:  


PT AMB TO RM 7 AFTER BEING SENT BY DR NAIR OFFICE FOR FURTHER EVAULATION. PT 


STATES HE HAS CP THAT STARTED LAST NIGHT. STATES HE THOUGHT HE HAD HEARTBURN. 


STATES HE HAS A STABBING PAIN AND SORENESS THAT RADIATES TO HIS JAW AND NECK.


Nursing Sepsis Screen:  No Definite Risk


Source:  patient, EMS


Exam Limitations:  no limitations





History of Present Illness


Date Seen by Provider:  2021


Time Seen by Provider:  09:45


Initial Comments


This 65-year-old gentleman presents to the emergency room at the direction of 

Dr. Tawil's office for evaluation of chest pain.  He describes a burning chest 

pain that also seems tender to palpation across the chest musculature.  This 

started last night around 22:00.  He presumed it to be heartburn.  He states the

pain radiated to both jaws and his teeth were diffusely aching.  He denies any 

cough or shortness of breath but does describe a generalized myalgia, worse than

his chronic pain.  He denies any acute cough or shortness of breath.  He is 

afebrile.  He denies vomiting or diarrhea.  He has no known cardiac history.  He

is currently being treated for UTI





Allergies and Home Medications


Allergies


Coded Allergies:  


     No Known Drug Allergies (Unverified , 3/11/17)





Patient Home Medication List


Home Medication List Reviewed:  Yes





Review of Systems


Review of Systems


Constitutional:  no symptoms reported


EENTM:  No Symptoms Reported


Respiratory:  No Symptoms Reported


Cardiovascular:  See HPI


Gastrointestinal:  No Symptoms Reported


Genitourinary:  No Symptoms Reported


Musculoskeletal:  see HPI


Skin:  no symptoms reported


Psychiatric/Neurological:  No Symptoms Reported


Endocrine:  No Symptoms Reported


Hematologic/Lymphatic:  No Symptoms Reported





Past Medical-Social-Family Hx


Past Med/Social Hx:  Reviewed Nursing Past Med/Soc Hx


Patient Social History


Alcohol Use:  Past History


Smoking Status:  Never a Smoker


2nd Hand Smoke Exposure:  Yes


Recent Infectious Disease Expo:  No


Recent Hopitalizations:  No





Immunizations Up To Date


Tetanus Booster (TDap):  Unknown





Past Medical History


Surgeries:  No


Respiratory:  No


Cardiac:  No


Neurological:  No


Genitourinary:  Yes


Gastrointestinal:  No


Musculoskeletal:  No


Endocrine:  No


Cancer:  No


Psychosocial:  No





Family Medical History


No Pertinent Family Hx





Physical Exam


Vital Signs





Vital Signs - First Documented




















Capillary Refill : Less Than 3 Seconds


Height, Weight, BMI


Height: 5'9.00"


Weight: 155lbs. oz. 70.869283nz; 23.00 BMI


Method:Stated


General Appearance:  No Apparent Distress, WD/WN


HEENT:  PERRL/EOMI, Normal ENT Inspection


Neck:  Normal Inspection


Respiratory:  Lungs Clear, Normal Breath Sounds, No Accessory Muscle Use, No 

Respiratory Distress


Cardiovascular:  No Edema, No Murmur, Tachycardia, Other (Tenderness of the 

chest wall with self palpation)


Gastrointestinal:  Normal Bowel Sounds, Non Tender, Soft


Extremity:  Normal Inspection, No Pedal Edema


Neurologic/Psychiatric:  Alert, Oriented x3, No Motor/Sensory Deficits, Normal 

Mood/Affect, CNs II-XII Norm as Tested


Skin:  Normal Color, Warm/Dry





Progress/Results/Core Measures


Results/Orders


Lab Results





Laboratory Tests








Test


 21


09:46 21


09:48 21


09:56 21


11:54 Range/Units


 


 


D-Dimer


 <= 0.27 


 


 


 


 0.00-0.49


UG/ML


 


White Blood Count


 


 7.2 


 


 


 4.3-11.0


10^3/uL


 


Red Blood Count


 


 5.05 


 


 


 4.30-5.52


10^6/uL


 


Hemoglobin  16.6    13.3-17.7  g/dL


 


Hematocrit  48    40-54  %


 


Mean Corpuscular Volume  95    80-99  fL


 


Mean Corpuscular Hemoglobin  33    25-34  pg


 


Mean Corpuscular Hemoglobin


Concent 


 35 


 


 


 32-36  g/dL





 


Red Cell Distribution Width  12.2    10.0-14.5  %


 


Platelet Count


 


 348 


 


 


 130-400


10^3/uL


 


Mean Platelet Volume  9.8    9.0-12.2  fL


 


Immature Granulocyte % (Auto)  0     %


 


Neutrophils (%) (Auto)  55    42-75  %


 


Lymphocytes (%) (Auto)  30    12-44  %


 


Monocytes (%) (Auto)  14 H   0-12  %


 


Eosinophils (%) (Auto)  1    0-10  %


 


Basophils (%) (Auto)  1    0-10  %


 


Neutrophils # (Auto)


 


 3.9 


 


 


 1.8-7.8


10^3/uL


 


Lymphocytes # (Auto)


 


 2.2 


 


 


 1.0-4.0


10^3/uL


 


Monocytes # (Auto)


 


 1.0 


 


 


 0.0-1.0


10^3/uL


 


Eosinophils # (Auto)


 


 0.1 


 


 


 0.0-0.3


10^3/uL


 


Basophils # (Auto)


 


 0.0 


 


 


 0.0-0.1


10^3/uL


 


Immature Granulocyte # (Auto)


 


 0.0 


 


 


 0.0-0.1


10^3/uL


 


Prothrombin Time  13.4    12.2-14.7  SEC


 


INR Comment  1.0    0.8-1.4  


 


Activated Partial


Thromboplast Time 


 29 


 


 


 24-35  SEC





 


Sodium Level  136    135-145  MMOL/L


 


Potassium Level  3.8    3.6-5.0  MMOL/L


 


Chloride Level  102      MMOL/L


 


Carbon Dioxide Level  23    21-32  MMOL/L


 


Anion Gap  11    5-14  MMOL/L


 


Blood Urea Nitrogen  18    7-18  MG/DL


 


Creatinine


 


 1.31 H


 


 


 0.60-1.30


MG/DL


 


Estimat Glomerular Filtration


Rate 


 55 


 


 


  





 


BUN/Creatinine Ratio  14     


 


Glucose Level  116 H     MG/DL


 


Calcium Level  9.3    8.5-10.1  MG/DL


 


Corrected Calcium      8.5-10.1  MG/DL


 


Magnesium Level  2.1    1.6-2.4  MG/DL


 


Total Bilirubin  0.8    0.1-1.0  MG/DL


 


Aspartate Amino Transf


(AST/SGOT) 


 34 


 


 


 5-34  U/L





 


Alanine Aminotransferase


(ALT/SGPT) 


 34 


 


 


 0-55  U/L





 


Alkaline Phosphatase  75      U/L


 


Total Creatine Kinase  118      U/L


 


Myoglobin


 


 83.4 


 


 


 10.0-92.0


NG/ML


 


Troponin I  < 0.028   < 0.028  <0.028  NG/ML


 


C-Reactive Protein High


Sensitivity 


 0.12 


 


 


 0.00-0.50


MG/DL


 


Total Protein  8.3 H   6.4-8.2  GM/DL


 


Albumin  4.6 H   3.2-4.5  GM/DL


 


Coronavirus 2019 (CATRACHO)   Not Detected   Not Detecte  








Micro Results





Microbiology


21 Influenza Types A,B Antigen (LUCIA) - Final, Complete


          





My Orders





Orders - OSEI BELLA MD


Nitroglycerin 0.4 Mg Btl 25's (Nitrostat (21 09:45)


Aspirin Chewable Tablet (Baby Aspirin Ch (21 09:45)


Cbc With Automated Diff (21 09:53)


Magnesium (21 09:53)


Chest 1 View, Ap/Pa Only (21 09:53)


Ekg Tracing (21 09:53)


Comprehensive Metabolic Panel (21 09:53)


Myoglobin Serum (21 09:53)


Protime With Inr (21 09:53)


Partial Thromboplastin Time (21 09:53)


O2 (21 09:53)


Monitor-Rhythm Ecg Trace Only (21 09:53)


Ed Iv/Invasive Line Start (21 09:53)


Troponin I (21 09:53)


Nitroglycerin 0.4 Mg Btl 25's (Nitrostat (21 10:00)


Creatine Kinase (21 09:53)


Aspirin Chewable Tablet (Baby Aspirin Ch (21 10:00)


Hs C Reactive Protein (21 09:54)


Influenza A And B Antigens (21 09:54)


Covid 19 Inhouse Test (21 09:54)


Fibrin Degradation Products (21 10:12)


Lactated Ringers (Lr 1000 Ml Iv Solution (21 10:45)


Troponin I (21 11:45)





Medications Given in ED





Current Medications








 Medications  Dose


 Ordered  Sig/Ngoc


 Route  Start Time


 Stop Time Status Last Admin


Dose Admin


 


 Aspirin  324 mg  ONCE  ONCE


 PO  21 10:00


 21 10:01 DC 21 09:58


324 MG


 


 Lactated Ringer's  1,000 ml @ 


 0 mls/hr  Q0M ONCE


 IV  21 10:45


 21 10:46 DC 21 11:17


0 MLS/HR


 


 Nitroglycerin  0.4 mg  UD  PRN


 SL  21 10:00


 21 10:08 DC 21 10:08


0.4 MG








Vital Signs/I&O











 21





 09:43 09:43 12:35


 


Pulse 105  85


 


Resp 27  18


 


B/P (MAP) 171/96 (121)  142/81


 


Pulse Ox 98  97


 


O2 Delivery Room Air Room Air 














Blood Pressure Mean:                    121











Progress


Progress Note #1:  


   Time:  10:45


Progress Note


Patient received aspirin and nitroglycerin.  Chest pain seemed to be atypical in

nature and he reports he can reproduce it with palpation.  He also reported 

generalized muscle aches and tenderness in multiple locations.  Creatinine 

kinase was negative.  Initial troponin was also negative.  EKG was unremarkable.

 Patient rated his pain as 7/10.  This improved to 3/10 after nitroglycerin x3. 

Blood pressure is improving.  A 2-hour troponin will be obtained.


Progress Note #2:  


   Time:  12:53


Progress Note


Patient has been pain-free now for about an hour.  No additional nitroglycerin 

was given.  Blood pressure is much improved.  Repeat troponin was negative.  

Patient was advised to follow-up with Dr. Meier and cardiology as soon as 

possible.  He was advised to stop any activity that causes pain and if his pain 

does not resolve quickly to return to the emergency room.


Initial ECG Impression Date:  2021


Initial ECG Impression Time:  09:44


Initial ECG Rate:  104


Initial ECG Rhythm:  S.Tach


Comment


Sinus tachycardia with no ST elevation or depression.  No abnormal intervals or 

axis deviation.





Diagnostic Imaging





   Diagonstic Imaging:  Xray


   Plain Films/CT/US/NM/MRI:  chest


Comments


NAME:   RACHEL KING


[facility] REC#:   [id number]


ACCOUNT#:   [id number]


PT STATUS:   REG ER


:   1955


PHYSICIAN:   OSEI BELLA MD


ADMIT DATE:   21/ER


***Signed***


Date of Exam:21





CHEST 1 VIEW, AP/PA ONLY





HISTORY: Chest pain.





COMPARISON: 2017.





TECHNIQUE: Frontal view of the chest.





FINDINGS:





Lung volumes are normal. No focal consolidation is seen. There is


no pleural effusion or pneumothorax. The cardiac silhouette is


normal in size.





IMPRESSION:


1. No acute pulmonary abnormality.





Dictated by: 





  Dictated on workstation # [id number]





Dict:   21 1113


Trans:   21 1122


[id number] [id number]





Interpreted by:     TOLU HOFFMAN MD


Electronically signed by: TOLU HOFFMAN MD 21 1122





Departure


Impression





   Primary Impression:  


   Atypical chest pain


   Additional Impression:  


   Hypertension


   Qualified Codes:  I10 - Essential (primary) hypertension


Disposition:  01 HOME, SELF-CARE


Condition:  Improved





Departure-Patient Inst.


Decision time for Depature:  12:54


Referrals:  


CHRISTINA MEIER MD (PCP/Family)


Primary Care Physician


Patient Instructions:  Chest Pain, Adult ED, High Blood Pressure (DC)





Add. Discharge Instructions:  


Follow-up with Dr. Meier soon as possible.  Discussed your high blood pressure 

and chest pain.  Discuss possible cardiology referral with Dr. Meier.


Stop any activity that causes recurrence of chest pain.  If pain does not 

quickly improve after stopping the activity, return to the emergency room.


Take aspirin 81 mg daily until otherwise instructed.  This may be purchased 

over-the-counter.


You may take Tylenol (acetaminophen) up to 1000 mg every 6 hours as needed an

d/or ibuprofen up to 600 mg every 6 hours as needed for pain.


Call with questions or concerns.


Return to the emergency room if you have recurrence of chest pain or develop 

other new concerning symptoms.











All discharge instructions reviewed with patient and/or family. Voiced 

understanding.





Copy


Copies To 1:   CHRISTINA MEIER MD, JOSHUA T MD        2021 10:14

## 2022-03-25 ENCOUNTER — HOSPITAL ENCOUNTER (EMERGENCY)
Dept: HOSPITAL 75 - ER | Age: 67
Discharge: HOME | End: 2022-03-25
Payer: COMMERCIAL

## 2022-03-25 VITALS — BODY MASS INDEX: 24.29 KG/M2 | WEIGHT: 164.02 LBS | HEIGHT: 69.02 IN

## 2022-03-25 VITALS — SYSTOLIC BLOOD PRESSURE: 134 MMHG | DIASTOLIC BLOOD PRESSURE: 88 MMHG

## 2022-03-25 DIAGNOSIS — U07.1: Primary | ICD-10-CM

## 2022-03-25 LAB
ALBUMIN SERPL-MCNC: 4 GM/DL (ref 3.2–4.5)
ALP SERPL-CCNC: 63 U/L (ref 40–136)
ALT SERPL-CCNC: 55 U/L (ref 0–55)
AMORPH SED URNS QL MICRO: (no result) /LPF
APTT PPP: YELLOW S
BACTERIA #/AREA URNS HPF: (no result) /HPF
BASOPHILS # BLD AUTO: 0 10^3/UL (ref 0–0.1)
BASOPHILS NFR BLD AUTO: 0 % (ref 0–10)
BILIRUB SERPL-MCNC: 0.9 MG/DL (ref 0.1–1)
BILIRUB UR QL STRIP: NEGATIVE
BUN/CREAT SERPL: 18
CALCIUM SERPL-MCNC: 8.7 MG/DL (ref 8.5–10.1)
CHLORIDE SERPL-SCNC: 98 MMOL/L (ref 98–107)
CO2 SERPL-SCNC: 22 MMOL/L (ref 21–32)
CREAT SERPL-MCNC: 1.05 MG/DL (ref 0.6–1.3)
EOSINOPHIL # BLD AUTO: 0 10^3/UL (ref 0–0.3)
EOSINOPHIL NFR BLD AUTO: 0 % (ref 0–10)
FIBRINOGEN PPP-MCNC: CLEAR MG/DL
GFR SERPLBLD BASED ON 1.73 SQ M-ARVRAT: 79 ML/MIN
GLUCOSE SERPL-MCNC: 91 MG/DL (ref 70–105)
GLUCOSE UR STRIP-MCNC: NEGATIVE MG/DL
HCT VFR BLD CALC: 48 % (ref 40–54)
HGB BLD-MCNC: 17.3 G/DL (ref 13.3–17.7)
KETONES UR QL STRIP: (no result)
LEUKOCYTE ESTERASE UR QL STRIP: NEGATIVE
LIPASE SERPL-CCNC: 29 U/L (ref 8–78)
LYMPHOCYTES # BLD AUTO: 1.1 10^3/UL (ref 1–4)
LYMPHOCYTES NFR BLD AUTO: 15 % (ref 12–44)
MANUAL DIFFERENTIAL PERFORMED BLD QL: NO
MCH RBC QN AUTO: 33 PG (ref 25–34)
MCHC RBC AUTO-ENTMCNC: 36 G/DL (ref 32–36)
MCV RBC AUTO: 91 FL (ref 80–99)
MONOCYTES # BLD AUTO: 1.2 10^3/UL (ref 0–1)
MONOCYTES NFR BLD AUTO: 16 % (ref 0–12)
NEUTROPHILS # BLD AUTO: 4.9 10^3/UL (ref 1.8–7.8)
NEUTROPHILS NFR BLD AUTO: 68 % (ref 42–75)
NITRITE UR QL STRIP: NEGATIVE
PH UR STRIP: 5.5 [PH] (ref 5–9)
PLATELET # BLD: 291 10^3/UL (ref 130–400)
PMV BLD AUTO: 9.6 FL (ref 9–12.2)
POTASSIUM SERPL-SCNC: 3.8 MMOL/L (ref 3.6–5)
PROT SERPL-MCNC: 7.2 GM/DL (ref 6.4–8.2)
PROT UR QL STRIP: NEGATIVE
RBC #/AREA URNS HPF: (no result) /HPF
SODIUM SERPL-SCNC: 134 MMOL/L (ref 135–145)
SP GR UR STRIP: >=1.03 (ref 1.02–1.02)
WBC # BLD AUTO: 7.3 10^3/UL (ref 4.3–11)
WBC #/AREA URNS HPF: (no result) /HPF

## 2022-03-25 PROCEDURE — 80053 COMPREHEN METABOLIC PANEL: CPT

## 2022-03-25 PROCEDURE — 81000 URINALYSIS NONAUTO W/SCOPE: CPT

## 2022-03-25 PROCEDURE — 36415 COLL VENOUS BLD VENIPUNCTURE: CPT

## 2022-03-25 PROCEDURE — 83880 ASSAY OF NATRIURETIC PEPTIDE: CPT

## 2022-03-25 PROCEDURE — 87636 SARSCOV2 & INF A&B AMP PRB: CPT

## 2022-03-25 PROCEDURE — 85025 COMPLETE CBC W/AUTO DIFF WBC: CPT

## 2022-03-25 PROCEDURE — 93005 ELECTROCARDIOGRAM TRACING: CPT

## 2022-03-25 PROCEDURE — 71045 X-RAY EXAM CHEST 1 VIEW: CPT

## 2022-03-25 PROCEDURE — 82947 ASSAY GLUCOSE BLOOD QUANT: CPT

## 2022-03-25 PROCEDURE — 84484 ASSAY OF TROPONIN QUANT: CPT

## 2022-03-25 PROCEDURE — 85379 FIBRIN DEGRADATION QUANT: CPT

## 2022-03-25 PROCEDURE — 83690 ASSAY OF LIPASE: CPT

## 2022-03-25 NOTE — DIAGNOSTIC IMAGING REPORT
EXAMINATION: Chest 1 view.



HISTORY: Dyspnea.



COMPARISON: 04/21/2021.



FINDINGS: Heart size and pulmonary vasculature are normal. The

lungs are clear without consolidation, pleural effusion or

pneumothorax. The osseous structures are intact.



IMPRESSION: No acute radiographic abnormality in the chest. 



Dictated by: 



  Dictated on workstation # [id number]

## 2022-04-06 ENCOUNTER — HOSPITAL ENCOUNTER (OUTPATIENT)
Dept: HOSPITAL 75 - RAD | Age: 67
End: 2022-04-06
Attending: UROLOGY
Payer: COMMERCIAL

## 2022-04-06 DIAGNOSIS — N39.0: Primary | ICD-10-CM

## 2022-04-06 PROCEDURE — 74176 CT ABD & PELVIS W/O CONTRAST: CPT

## 2022-04-06 NOTE — DIAGNOSTIC IMAGING REPORT
PROCEDURE: CT abdomen and pelvis without contrast.



TECHNIQUE: Multiple contiguous axial images were obtained through

the abdomen and pelvis without the use of intravenous contrast.

Auto Exposure Controls were utilized during the CT exam to meet

ALARA standards for radiation dose reduction. 



INDICATION:  Back pain and urinary tract infections.



FINDINGS: The lung bases are clear. No focal liver mass is

detected. Gallbladder is contracted. No biliary ductal dilatation

is seen. Pancreas and spleen are unremarkable. No adrenal mass is

detected. No renal calculi are seen. There is no hydronephrosis.

Aorta is calcified but nonaneurysmal. Bowel loops are normal

caliber. There is a left inguinal hernia which does contain

portion of the descending colon at the junction with the sigmoid.

No obstruction is seen. No free fluid is identified. There is a

cystic lesion adjacent to the bladder on the left side measuring

3.5 cm. This could represent a bladder diverticulum. A

postcontrast study would be useful. There is a small

fat-containing right inguinal hernia.



IMPRESSION:

1. Left inguinal hernia containing a portion of the descending

colon. No obstruction or strangulation is seen.

2. Cystic lesion adjacent to the bladder on the left side which

could represent a bladder diverticulum. Postcontrast study would

be useful for further characterization.

3. No other significant abnormality is detected.



Dictated by: 



  Dictated on workstation # [id number]

## 2023-01-28 ENCOUNTER — HOSPITAL ENCOUNTER (EMERGENCY)
Dept: HOSPITAL 75 - ER | Age: 68
Discharge: HOME | End: 2023-01-28
Payer: MEDICARE

## 2023-01-28 VITALS — SYSTOLIC BLOOD PRESSURE: 142 MMHG | DIASTOLIC BLOOD PRESSURE: 96 MMHG

## 2023-01-28 DIAGNOSIS — Z23: ICD-10-CM

## 2023-01-28 DIAGNOSIS — S61.512A: Primary | ICD-10-CM
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
## 2023-01-28 PROCEDURE — 12001 RPR S/N/AX/GEN/TRNK 2.5CM/<: CPT

## 2023-01-28 PROCEDURE — 90715 TDAP VACCINE 7 YRS/> IM: CPT

## 2023-01-28 NOTE — ED UPPER EXTREMITY
General


Chief Complaint:  Laceration


Stated Complaint:  LACERATION ON LT WRIST


Nursing Triage Note:  


PT AMB TO RM 4 WITH C/O L WRIST LAC FROM A  ABOUT 30 MIN PTA. PT 


DENIES ANY PAIN. PT DOES NOT THINK HE HAS A TETANUS SHOT IN MANY YEARS





Allergies and Home Medications


Allergies


Coded Allergies:  


     No Known Drug Allergies (Unverified , 3/11/17)





Past Medical-Social-Family Hx


Patient Social History


Tobacco Use?:  No


Use of E-Cig and/or Vaping dev:  No


Substance use?:  No


Alcohol Use?:  No


Pt feels they are or have been:  No





Immunizations Up To Date


Tetanus Booster (TDap):  Unknown


Influenza Vaccine Up-to-Date:  No; Not Current





Past Medical History


Surgery/Hospitalization HX:  


ASTHMA


Surgeries:  No


Respiratory:  No


Cardiac:  No


Neurological:  No


Genitourinary:  Yes


Gastrointestinal:  No


Musculoskeletal:  No


Endocrine:  No


Cancer:  No


Psychosocial:  No





Family Medical History


No Pertinent Family Hx





Physical Exam


Vital Signs





Vital Signs - First Documented








 1/28/23





 10:48


 


Temp 36.4


 


Pulse 96


 


Resp 14


 


B/P (MAP) 142/96 (111)





Capillary Refill :


Height, Weight, BMI


Height: 5'9.00"


Weight: 155lbs. oz. 70.798420ne; 24.00 BMI


Method:Stated





Progress/Results/Core Measures


Results/Orders


My Orders





Orders - OSEI BELLA MD


Dipht,Pertuss(Acell),Tet Adult (Boostrix (1/28/23 11:00)





Medications Given in ED





Current Medications








 Medications  Dose


 Ordered  Sig/Ngoc


 Route  Start Time


 Stop Time Status Last Admin


Dose Admin


 


 Diphtheria/


 Tetanus/Acell


 Pertussis  0.5 ml  ONCE ONCE


 IM  1/28/23 11:00


 1/28/23 11:01 DC 1/28/23 11:11


0.5 ML








Vital Signs/I&O











 1/28/23





 10:48


 


Temp 36.4


 


Pulse 96


 


Resp 14


 


B/P (MAP) 142/96 (111)














Blood Pressure Mean:                    111











Departure


Impression





   Primary Impression:  


   Laceration of wrist


   Qualified Codes:  S61.512A - Laceration without foreign body of left wrist, 

   initial encounter


Disposition:  01 HOME, SELF-CARE


Condition:  Improved





Departure-Patient Inst.


Decision time for Depature:  11:28


Referrals:  


CHRISTINA MEIER MD (PCP/Family)


Primary Care Physician


Patient Instructions:  Laceration Repair With Staples ED





Add. Discharge Instructions:  


You may remove the pressure dressing when you return home.  If bleeding resumes,

applied pressure and elevate for 15 to 30 minutes.  Bleeding should stop.


Keep the wound clean and dry except for normal handwashing and showering.  Do 

not submerge until the sutures removed.


Cover when active or in dirty environments.  You may leave open to air when at 

rest and in a clean environment.


You may apply antibiotic ointment to prevent dressing from sticking to the wound

as blood drainage dries.


Monitor for signs of infection such as increasing redness, increasing swelling, 

puslike drainage, or fever.  Return to care promptly if you notice the symptoms.


Return at your convenience in about 7 days to have the suture removed.


Call with questions or concerns.


You may expect some soreness from your tetanus shot and possibly even a low-

grade fever, body aches, or chills for 1 to 2 days.





All discharge instructions reviewed with patient and/or family. Voiced 

understanding.











OSEI BELLA MD        Jan 28, 2023 11:30

## 2023-02-07 ENCOUNTER — HOSPITAL ENCOUNTER (EMERGENCY)
Dept: HOSPITAL 75 - ER | Age: 68
Discharge: HOME | End: 2023-02-07
Payer: MEDICARE

## 2023-02-07 VITALS — SYSTOLIC BLOOD PRESSURE: 135 MMHG | DIASTOLIC BLOOD PRESSURE: 78 MMHG

## 2023-02-07 DIAGNOSIS — Z48.02: Primary | ICD-10-CM

## 2024-07-30 NOTE — ED GENERAL
General


Chief Complaint:  Dizziness/Syncope


Stated Complaint:  DIZZY/NAUSEA/COLD SWEATS/SORE THROAT


Nursing Triage Note:  


Pt arrives via POV from home for c/o dizziness, generalized weakness, SOB, et 


general malaise; onset "for a while" but worsening since last week. Pt reports 


being seen at [facility] this past Saturday, states tested negative for Covid/Flu. Pt 


also reports frequent urination.


Source of Information:  Patient


Exam Limitations:  No Limitations





History of Present Illness


Date Seen by Provider:  Mar 25, 2022


Time Seen by Provider:  18:00


Initial Comments


To ER with about a 10-day course of dizziness generalized weakness shortness of 

breath cough general malaise.  [place] [facility] [place]

[place] and given a prescription for prednisone which she completed on Wednesday of 

this past week.  He denies any fevers.  Denies any chest pain.


Timing/Duration:  Constant


Severity:  Moderate


Associated Systoms:  Denies Symptoms





Allergies and Home Medications


Allergies


Coded Allergies:  


     No Known Drug Allergies (Unverified , 3/11/17)





Patient Home Medication List


Home Medication List Reviewed:  Yes





Review of Systems


Review of Systems


Constitutional:  see HPI, chills, diaphoresis, dizziness, malaise, weakness


EENTM:  see HPI


Respiratory:  see HPI, cough


Cardiovascular:  no symptoms reported


Genitourinary:  no symptoms reported


Musculoskeletal:  no symptoms reported


Skin:  no symptoms reported


Psychiatric/Neurological:  No Symptoms Reported


Hematologic/Lymphatic:  No Symptoms Reported


Immunological/Allergic:  no symptoms reported





Past Medical-Social-Family Hx


Immunizations Up To Date


Tetanus Booster (TDap):  Unknown





Past Medical History


Surgeries:  No


Respiratory:  No


Cardiac:  No


Neurological:  No


Genitourinary:  Yes


Gastrointestinal:  No


Musculoskeletal:  No


Endocrine:  No


Cancer:  No


Psychosocial:  No





Family Medical History


No Pertinent Family Hx





Physical Exam


Vital Signs





Vital Signs - First Documented








 3/25/22





 17:45


 


Temp 36.1


 


Pulse 94


 


Resp 20


 


B/P (MAP) 135/97 (110)


 


Pulse Ox 97


 


O2 Delivery Room Air





Capillary Refill : Less Than 3 Seconds


Height, Weight, BMI


Height: 5'9.00"


Weight: 155lbs. oz. 70.125015qy; 24.00 BMI


Method:Stated


General Appearance:  No Apparent Distress, WD/WN


Eyes:  Bilateral Eye Normal Inspection, Bilateral Eye PERRL, Bilateral Eye EOMI


HEENT:  PERRL/EOMI, TMs Normal


Neck:  Full Range of Motion, Normal Inspection


Respiratory:  Lungs Clear, Normal Breath Sounds, No Accessory Muscle Use, No 

Respiratory Distress, Other (Lungs are clear with good air movement.  After 

walking to room 7 his oxygen saturation is 97%.  He has no accessory muscle use.

 Heart rate is in the 70s.)


Cardiovascular:  Regular Rate, Rhythm, Normal Peripheral Pulses


Gastrointestinal:  Normal Bowel Sounds, Non Tender, Soft


Extremity:  Normal Capillary Refill, Normal Inspection


Neurologic/Psychiatric:  Alert, Oriented x3


Skin:  Normal Color, Warm/Dry





Progress/Results/Core Measures


Suspected Sepsis


SIRS


Temperature: 


Pulse: 94 


Respiratory Rate: 20


 


Laboratory Tests


3/25/22 18:00: White Blood Count 7.3


Blood Pressure 135 /97 


Mean: 110


 


Laboratory Tests


3/25/22 18:00: 


Creatinine 1.05, Platelet Count 291, Total Bilirubin 0.9








Results/Orders


Lab Results





Laboratory Tests








Test


 3/25/22


17:55 3/25/22


18:00 3/25/22


18:06 Range/Units


 


 


Influenza Type A (RT-PCR) Not Detected    Not Detecte  


 


Influenza Type B (RT-PCR) Not Detected    Not Detecte  


 


SARS-CoV-2 RNA (RT-PCR) Detected H   Not Detecte  


 


White Blood Count


 


 7.3 


 


 4.3-11.0


10^3/uL


 


Red Blood Count


 


 5.31 


 


 4.30-5.52


10^6/uL


 


Hemoglobin  17.3   13.3-17.7  g/dL


 


Hematocrit  48   40-54  %


 


Mean Corpuscular Volume  91   80-99  fL


 


Mean Corpuscular Hemoglobin  33   25-34  pg


 


Mean Corpuscular Hemoglobin


Concent 


 36 


 


 32-36  g/dL





 


Red Cell Distribution Width  11.8   10.0-14.5  %


 


Platelet Count


 


 291 


 


 130-400


10^3/uL


 


Mean Platelet Volume  9.6   9.0-12.2  fL


 


Immature Granulocyte % (Auto)  1    %


 


Neutrophils (%) (Auto)  68   42-75  %


 


Lymphocytes (%) (Auto)  15   12-44  %


 


Monocytes (%) (Auto)  16 H  0-12  %


 


Eosinophils (%) (Auto)  0   0-10  %


 


Basophils (%) (Auto)  0   0-10  %


 


Neutrophils # (Auto)


 


 4.9 


 


 1.8-7.8


10^3/uL


 


Lymphocytes # (Auto)


 


 1.1 


 


 1.0-4.0


10^3/uL


 


Monocytes # (Auto)


 


 1.2 H


 


 0.0-1.0


10^3/uL


 


Eosinophils # (Auto)


 


 0.0 


 


 0.0-0.3


10^3/uL


 


Basophils # (Auto)


 


 0.0 


 


 0.0-0.1


10^3/uL


 


Immature Granulocyte # (Auto)


 


 0.1 


 


 0.0-0.1


10^3/uL


 


D-Dimer


 


 0.47 


 


 0.00-0.49


UG/ML


 


Sodium Level  134 L  135-145  MMOL/L


 


Potassium Level  3.8   3.6-5.0  MMOL/L


 


Chloride Level  98     MMOL/L


 


Carbon Dioxide Level  22   21-32  MMOL/L


 


Anion Gap  14   5-14  MMOL/L


 


Blood Urea Nitrogen  19 H  7-18  MG/DL


 


Creatinine


 


 1.05 


 


 0.60-1.30


MG/DL


 


Estimat Glomerular Filtration


Rate 


 79 


 


  





 


BUN/Creatinine Ratio  18    


 


Glucose Level  91     MG/DL


 


Calcium Level  8.7   8.5-10.1  MG/DL


 


Corrected Calcium  8.7   8.5-10.1  MG/DL


 


Total Bilirubin  0.9   0.1-1.0  MG/DL


 


Aspartate Amino Transf


(AST/SGOT) 


 34 


 


 5-34  U/L





 


Alanine Aminotransferase


(ALT/SGPT) 


 55 


 


 0-55  U/L





 


Alkaline Phosphatase  63     U/L


 


Troponin I  < 0.028   <0.028  NG/ML


 


B-Type Natriuretic Peptide  10.2   <100.0  PG/ML


 


Total Protein  7.2   6.4-8.2  GM/DL


 


Albumin  4.0   3.2-4.5  GM/DL


 


Lipase  29   8-78  U/L


 


Glucometer   99    MG/DL








My Orders





Orders - MARCELLUS WARNER APRN


Cbc With Automated Diff (3/25/22 18:02)


Comprehensive Metabolic Panel (3/25/22 18:02)


Lipase (3/25/22 18:02)


Ua Culture If Indicated (3/25/22 18:02)


Chest 1 View, Ap/Pa Only (3/25/22 18:02)


Ekg Tracing (3/25/22 18:02)


Troponin I Clark (3/25/22 18:02)


Bnp Clark (3/25/22 18:02)


Fibrin Degradation Products (3/25/22 18:02)


Ed Iv/Invasive Line Start (3/25/22 18:02)


Covid 19 Inhouse Test (3/25/22 18:02)


Influenza A And B By Pcr (3/25/22 18:02)





Vital Signs/I&O











 3/25/22





 17:45


 


Temp 36.1


 


Pulse 94


 


Resp 20


 


B/P (MAP) 135/97 (110)


 


Pulse Ox 97


 


O2 Delivery Room Air





Capillary Refill : Less Than 3 Seconds








Blood Pressure Mean:                    110











Departure


Communication (Admissions)


1919-heart rate 77 oxygen 94% room air.  Resting at this time.  He is beyond the

point of getting monoclonal antibody infusion.  He is unvaccinated against 

COVID.  We will discharged home at this point with return precautions.


NAME:   RACHEL KING


[facility] REC#:   [id number]


ACCOUNT#:   [id number]


PT STATUS:   REG ER


:   1955


PHYSICIAN:   MARCELLUS WARNER


ADMIT DATE:   22/ER


                                   ***Draft***


Date of Exam:22





CHEST 1 VIEW, AP/PA ONLY








EXAMINATION: Chest 1 view.





HISTORY: Dyspnea.





COMPARISON: 2021.





FINDINGS: Heart size and pulmonary vasculature are normal. The


lungs are clear without consolidation, pleural effusion or


pneumothorax. The osseous structures are intact.





IMPRESSION: No acute radiographic abnormality in the chest. 





  Dictated on workstation # [id number]








Dict:   22


Trans:   22


[facility] [id number]





Interpreted by:     SANDER LEBRON DO


Electronically signed by:





Impression





   Primary Impression:  


   COVID-19


Disposition:  01 HOME, SELF-CARE


Condition:  Stable





Departure-Patient Inst.


Decision time for Depature:  19:19


Referrals:  


CHRISTINA MEIER MD (PCP/Family)


Primary Care Physician


Patient Instructions:  COVID-19 (DC)





Add. Discharge Instructions:  


1.  Tylenol and ibuprofen for body aches fevers and pain.  Unfortunately expect 

to feel short of breath and generally weak for another 2 weeks.





All discharge instructions reviewed with patient and/or family. Voiced 

understanding.











MARCELLUS WARNER             Mar 25, 2022 18:40 General